# Patient Record
(demographics unavailable — no encounter records)

---

## 2025-01-13 NOTE — CONSULT LETTER
[Dear  ___] : Dear  [unfilled], [Consult Letter:] : I had the pleasure of evaluating your patient, [unfilled]. [( Thank you for referring [unfilled] for consultation for _____ )] : Thank you for referring [unfilled] for consultation for [unfilled] [Please see my note below.] : Please see my note below. [Consult Closing:] : Thank you very much for allowing me to participate in the care of this patient.  If you have any questions, please do not hesitate to contact me. [Sincerely,] : Sincerely, [FreeTextEntry3] : Carlo Anand MD  Gastroenterology [facility], [place]

## 2025-01-13 NOTE — ASSESSMENT
[FreeTextEntry1] : High-fiber diet Increase Linzess to 145 mcg daily 1/2-hour before breakfast  Office follow-up in 1 month   I spent 24 minutes with the patient and answered all of her questions

## 2025-01-22 NOTE — ADDENDUM
[FreeTextEntry1] : This note was written by Nkechi Medina, acting as the  for Dr. Rosa. This note accurately reflects the work and decisions made by Dr. Rosa.

## 2025-01-22 NOTE — DISCUSSION/SUMMARY
[FreeTextEntry1] : BLAYNE is a 75 year female who presents for On exam, normal PVR, no POP.     [] []   f/u   All questions answered.

## 2025-01-22 NOTE — HISTORY OF PRESENT ILLNESS
[FreeTextEntry1] : BLAYNE is a 75 year female who presents for She was referred by        Daytime frequency:   Nocturia:   Urinary urgency:  Leakage of urine with urgency:   Leakage of urine with coughing sneezing laughing:   Incontinence pad use:  Sensation of incomplete bladder emptying:  History of frequent urinary tract infections:  History of hematuria:  Previous treatment:   Vaginal symptoms:   Bowel symptoms:

## 2025-02-10 NOTE — CONSULT LETTER
[Dear  ___] : Dear  [unfilled], [Consult Letter:] : I had the pleasure of evaluating your patient, [unfilled]. [( Thank you for referring [unfilled] for consultation for _____ )] : Thank you for referring [unfilled] for consultation for [unfilled] [Please see my note below.] : Please see my note below. [Consult Closing:] : Thank you very much for allowing me to participate in the care of this patient.  If you have any questions, please do not hesitate to contact me. [Sincerely,] : Sincerely, [FreeTextEntry3] : Carlo Anand MD  Gastroenterology [facility]

## 2025-02-10 NOTE — HISTORY OF PRESENT ILLNESS
[FreeTextEntry1] : The translation line was used.  Shari was the  . Her number was [id number]  She admis to constipation and left lower quadrant abdominal pain.  She felt better after  a bowel movement.  She had been on Linzess 72 mcg which was increased to 145 mcg on the last visit.  She is having better bowel movements and has less left lower quadrant abdominal pain but still admits to intermittent crampy left lower quadrant abdominal pain like a spasm.

## 2025-02-10 NOTE — ASSESSMENT
[FreeTextEntry1] : Continue Linzess 145 mcg daily Start dicyclomine 10 mg twice a day  Office follow-up in 1 month   I spent 23 minutes with the patient and answered all of her question

## 2025-03-10 NOTE — ASSESSMENT
[FreeTextEntry1] : Avoid milk and dairy products religiously  Office follow-up in 1 month   I spent 24 minutes with the patient and answered all of her questions

## 2025-03-10 NOTE — HISTORY OF PRESENT ILLNESS
[FreeTextEntry1] : The translation line was used.   was Dewayne #[id number]  Patient complains of increased gas and diffuse crampy abdominal pain.  Her constipation is no longer present. She takes Linzess only as needed.  She does admit to taking a lot of milk and dairy products. I told the  to tell the patient to stop all milk and dairy products including, cheese ,yogurt and ice cream and we will see if this improves her symptoms.